# Patient Record
(demographics unavailable — no encounter records)

---

## 2024-10-14 NOTE — PLAN
[FreeTextEntry1] : A/P: DM:  Advised ADA diet, daily regular exercise, to lose weight. Check FPG/PPG at home and keep a log, to c/w meds, repeat A1c and reassess. Advised daily self-foot exams. Annual eval for dilated eye exam.   Constipation: advised to inc oral fiber / fluid intake, meds prn as ordered   Hyperlipidemia:  Advised on strict low-fat diet, daily regular exercise, to c/w meds, FLP/LFT to be monitored.  HTN:  Stable, advised strict low salt diet, daily regular exercise, to c/w meds, bmp to be monitored closely.  Thyroid nodule - US 07/2024 d/w pt - to be monitored   GERD: stable on PPI , to avoid NSAIDS, alcohol  Renal cyst - US 07/2024 d/w pt to be monitored - to repeat US and reassess in 1 yr f/u 3 m

## 2024-10-14 NOTE — HISTORY OF PRESENT ILLNESS
[FreeTextEntry1] : f/u DM  [de-identified] : Alesia is a 46 yo M w PMHx bipolar type 2 disorder,  chronic insomnia,  T2DM well controlled,  low testosterone - is off replacement now, follows with urology.  ED s/p penile implant TIEN uses CPAP nightly BPH s/p prostate surgery non obstructive CAD - asymptomatic currently  Thyroid nodules: Ultrasound 07/2024 discussed with patient Scattered thyroid nodules. Consider follow-up ultrasound in one year. renal cyst: Last ultrasound done in July 2024 discussed with patient following with pain management for neck shots - stable now was seen by Psych - advised labs   Chronic Constipation: Takes daily fiber supplements Colonoscopy - 2023 nl as per pt

## 2025-01-31 NOTE — ASSESSMENT
[FreeTextEntry1] : bipolar stable dm stable htn  hld stable work on weight  medically stable bph see new urologist

## 2025-01-31 NOTE — HISTORY OF PRESENT ILLNESS
[FreeTextEntry1] : for cpe [de-identified] : for cpe history of htn niddm, hld bipolar disorder has been on lithium

## 2025-01-31 NOTE — HEALTH RISK ASSESSMENT
[Good] : ~his/her~  mood as  good [No] : No [No falls in past year] : Patient reported no falls in the past year [Little interest or pleasure doing things] : 1) Little interest or pleasure doing things [Feeling down, depressed, or hopeless] : 2) Feeling down, depressed, or hopeless [0] : 2) Feeling down, depressed, or hopeless: Not at all (0) [PHQ-2 Negative - No further assessment needed] : PHQ-2 Negative - No further assessment needed [Never] : Never [NO] : No [HIV test declined] : HIV test declined [Hepatitis C test declined] : Hepatitis C test declined [None] : None [Alone] : lives alone [College] : College [Single] : single [Feels Safe at Home] : Feels safe at home [Fully functional (bathing, dressing, toileting, transferring, walking, feeding)] : Fully functional (bathing, dressing, toileting, transferring, walking, feeding) [Fully functional (using the telephone, shopping, preparing meals, housekeeping, doing laundry, using] : Fully functional and needs no help or supervision to perform IADLs (using the telephone, shopping, preparing meals, housekeeping, doing laundry, using transportation, managing medications and managing finances) [Smoke Detector] : smoke detector [Safety elements used in home] : safety elements used in home [Seat Belt] :  uses seat belt [[id number]] : 0 [Change in mental status noted] : No change in mental status noted [Language] : denies difficulty with language [Behavior] : denies difficulty with behavior [Learning/Retaining New Information] : denies difficulty learning/retaining new information [Handling Complex Tasks] : denies difficulty handling complex tasks [Reasoning] : denies difficulty with reasoning [Spatial Ability and Orientation] : denies difficulty with spatial ability and orientation [Sexually Active] : not sexually active [High Risk Behavior] : no high risk behavior [Reports changes in hearing] : Reports no changes in hearing [Reports changes in vision] : Reports no changes in vision [Reports normal functional visual acuity (ie: able to read med bottle)] : Reports poor functional visual acuity.  [Reports changes in dental health] : Reports no changes in dental health [Carbon Monoxide Detector] : no carbon monoxide detector [Sunscreen] : does not use sunscreen [Travel to Developing Areas] : does not  travel to developing areas [TB Exposure] : is not being exposed to tuberculosis [Caregiver Concerns] : does not have caregiver concerns [ColonoscopyDate] : 2023

## 2025-03-05 NOTE — HISTORY OF PRESENT ILLNESS
[FreeTextEntry1] : Mr. MICHELLE GALVAN  is a 48 year old male with a PMhx of DM2, HLD, Bipolar Disorder on Lithium who presents with neck and back pain.   Location: Posterior neck and back Onset: Injured at work in 5/2024, was lifting a few cases of tiles, felt immediate pain in back/neck.  Provocation/Palliative: Worse with movement, better with rest Quality: Achy,  Radiation: Down to R upper arm, and down R leg to R knee Severity: Can be 10/10, currently moderate Timing: Not improving with time  Denies any associated numbness. Denies any associated arm or hand weakness. Denies any loss of bowel/bladder control or any groin numbness. Previous medications trialed: Cyclobenzaprine, Ibuprofen Previous procedures relevant to complaint: ESIs in low back with some relief with Dr. Milton Has tried conservative treatment?: Currently in PT  Patient follows with outside pain management physician Dr. Milton

## 2025-03-05 NOTE — ASSESSMENT
[FreeTextEntry1] : Mr. MICHELLE GALVAN is a 48 year old male who presents with chronic neck and low back pain following work incident back in 5/2024. Patient's pain is likely from exacerbation of underlying spondylosis. Denies any red flag signs. Will recommend: - Patient inquired about disability paperwork and work restrictions. Will have patient seen occupational medicine for further evaluation and recommendations - Patient to follow up with Dr. Milton for treatment of his pain, pending a neck injection in next 2 weeks.  Return as needed. Patient aware of red flag signs including any changes to their bowel/bladder control, groin numbness or new weakness. Patient knows to seek immediate attention by calling 911 or going to nearest ER if these symptoms appear.

## 2025-03-05 NOTE — PHYSICAL EXAM
[FreeTextEntry1] : PE: Constitutional: In NAD, calm and cooperative MSK (Neck and Back) 	Inspection: no gross swelling identified 	Palpation: Tenderness of the bilateral lower cervical and lumbar paraspinals 	ROM: Pain at end cervical extension/flexion and with lumbar extension/flexion 	Strength: 5/5 strength in bilateral upper and lower extremities 	Reflexes: 2+ Biceps/Brachioradialis/Triceps/patella/Achilles reflex bilaterally, Hoffmans/Clonus negative bilaterally 	Sensation: Intact to light touch in bilateral upper and lower extremities 	Special tests: Spurlings test negative bilaterally, facet loading positive bilaterally

## 2025-03-05 NOTE — DATA REVIEWED
[FreeTextEntry1] : MR C Spine Stand up MRI 7/2024 reviewed MR L Spine Stand up MRI 7/2024 reviewed CMP 1/2025 reviewed

## 2025-03-17 NOTE — ASSESSMENT
[Indicate if, in your opinion, the incident that the patient described was the competent medical cause of this injury/illness.] : The incident that the patient described was the competent medical cause of this injury/illness: Yes [Indicate if the patient's complaints are consistent with his/her history of the injury/illness.] : Indicate if the patient's complaints are consistent with his/her history of the injury/illness: Yes [Yes] : Yes, it is consistent [Can the patient return to usual work activities as indicated? If yes, indicate date___] : The patient cannot return to usual work activities as indicated. 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 MRI confirms herniated cervical disc and herniated lumbar disc. Patient has decreased ROM of cervical neck. decreased ROM of shoulders, weakness in upper and lower extremities, difficulty walking more than 2 blocks, difficulty sitting for more than 30 minutes, difficulty standing for more than 30 minutes. Pain in neck, lower back and intermittent numbness in lower extremities. [FreeTextEntry2] : Decrease strength in arms, decreased strength in shoulders, decreased strengthen and ROM in bilateral legs.  Patient has decreased ROM of cervical neck. decreased ROM of shoulders, weakness in upper and lower extremities, difficulty walking more than 2 blocks, difficulty sitting for more than 30 minutes, difficulty standing for more than 30 minutes. Pain in neck, lower back and intermittent numbness in lower extremities. [FreeTextEntry3] :  Patient has decreased ROM of cervical neck. decreased ROM of shoulders, weakness in upper and lower extremities, difficulty walking more than 2 blocks, difficulty sitting for more than 30 minutes, difficulty standing for more than 30 minutes. Pain in neck, lower back and intermittent numbness in lower extremities. [FreeTextEntry4] :  Patient has decreased ROM of cervical neck. decreased ROM of shoulders, weakness in upper and lower extremities, difficulty walking more than 2 blocks, difficulty sitting for more than 30 minutes, difficulty standing for more than 30 minutes. Pain in neck, lower back and intermittent numbness in lower extremities. [FreeTextEntry5] : 90 [FreeTextEntry6] : Difficulty standing for more than 30 minutes, decreased strength in upper extremities and decreased strength in lower extremities. Decreased ROM in cervical neck. decreased ROM lumbar area.

## 2025-03-17 NOTE — HISTORY OF PRESENT ILLNESS
[FreeTextEntry1] : Patient is a 48-year-old male who presents with a neck and lower back injury that occurred at work on 05/10/2024. Patient was lifting cases of ceiling tiles and noted neck and lower back pain. 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  [FreeTextEntry4] : Physical therapy with some improvement in mobility, chiropractic treatments weekly, multiple epidural injections in the lower back with some relief of pain [FreeTextEntry5] : No [Has the patient missed work because of the injury/illness?] : The patient has missed work because of the injury/illness. [FreeTextEntry6] : 06/28/2024

## 2025-03-17 NOTE — REVIEW OF SYSTEMS
[Fever] : no fever [Chills] : no chills [Feeling Poorly] : feeling poorly [Feeling Tired] : feeling tired [Arthralgias] : arthralgias [Joint Pain] : no joint pain [Joint Swelling] : no joint swelling [Joint Stiffness] : no joint stiffness [Limb Pain] : no limb pain [Limb Swelling] : no limb swelling [Confused] : no confusion [Convulsions] : no convulsions [Dizziness] : no dizziness [Fainting] : no fainting [Limb Weakness] : limb weakness [Difficulty Walking] : difficulty walking [Negative] : Heme/Lymph [FreeTextEntry2] : Difficulty walking more than 2 blocks, difficulty sitting for more than 30 minutes and difficulty standing for more than 30 minutes. [FreeTextEntry9] : decreased ROM in arms, decreased strength in upper extremities and lower extremities. [de-identified] : intermittent pins and needles in legs

## 2025-03-17 NOTE — PLAN
[FreeTextEntry1] : Continue physical therapy 2 times week for 2 weeks, NSAIDS prn with food, follow up with pain management for epidural injections.  [FreeTextEntry3] : fair [FreeTextEntry4] : 4 weeks

## 2025-03-17 NOTE — PHYSICAL EXAM
[General Appearance - Alert] : alert [General Appearance - In No Acute Distress] : in no acute distress [Sclera] : the sclera and conjunctiva were normal [PERRL With Normal Accommodation] : pupils were equal in size, round, and reactive to light [Extraocular Movements] : extraocular movements were intact [Outer Ear] : the ears and nose were normal in appearance [Oropharynx] : the oropharynx was normal [Auscultation Breath Sounds / Voice Sounds] : lungs were clear to auscultation bilaterally [Heart Rate And Rhythm] : heart rate was normal and rhythm regular [Heart Sounds] : normal S1 and S2 [Heart Sounds Gallop] : no gallops [Murmurs] : no murmurs [Heart Sounds Pericardial Friction Rub] : no pericardial rub [Full Pulse] : the pedal pulses are present [Edema] : there was no peripheral edema [Breast Appearance] : normal in appearance [Breast Palpation Mass] : no palpable masses [Bowel Sounds] : normal bowel sounds [Abdomen Soft] : soft [Abdomen Tenderness] : non-tender [Abdomen Mass (___ Cm)] : no abdominal mass palpated [Skin Color & Pigmentation] : normal skin color and pigmentation [Skin Turgor] : normal skin turgor [] : no rash [FreeTextEntry1] : decreased sensation to light touch in lower extremities able to feel 6.10 level filament. Unable to feel 2.83 level filament bilaterally  [Oriented To Time, Place, And Person] : oriented to person, place, and time [Impaired Insight] : insight and judgment were intact [Affect] : the affect was normal

## 2025-04-11 NOTE — REVIEW OF SYSTEMS
[Feeling Poorly] : feeling poorly [Feeling Tired] : feeling tired [Arthralgias] : arthralgias [Limb Weakness] : limb weakness [Difficulty Walking] : difficulty walking [Negative] : Heme/Lymph [Fever] : no fever [Chills] : no chills [Joint Pain] : no joint pain [Joint Swelling] : no joint swelling [Joint Stiffness] : no joint stiffness [Limb Pain] : no limb pain [Limb Swelling] : no limb swelling [Confused] : no confusion [Convulsions] : no convulsions [Dizziness] : no dizziness [Fainting] : no fainting [FreeTextEntry2] : Difficulty walking more than 2 blocks, difficulty sitting for more than 30 minutes and difficulty standing for more than 30 minutes. [FreeTextEntry9] : decreased ROM in arms, decreased strength in upper extremities and lower extremities. [de-identified] : intermittent pins and needles in legs

## 2025-04-11 NOTE — PLAN
[FreeTextEntry1] : Continue physical therapy 2 times week for 6 weeks, NSAIDS prn with food, follow up with pain management for epidural injections. Epidural for pain management, follow up with neurologist for nerve conduction testing  [FreeTextEntry3] : fair [FreeTextEntry4] : 4 weeks

## 2025-04-11 NOTE — PHYSICAL EXAM
[General Appearance - Alert] : alert [General Appearance - In No Acute Distress] : in no acute distress [Sclera] : the sclera and conjunctiva were normal [PERRL With Normal Accommodation] : pupils were equal in size, round, and reactive to light [Extraocular Movements] : extraocular movements were intact [Outer Ear] : the ears and nose were normal in appearance [Oropharynx] : the oropharynx was normal [Auscultation Breath Sounds / Voice Sounds] : lungs were clear to auscultation bilaterally [Heart Rate And Rhythm] : heart rate was normal and rhythm regular [Heart Sounds] : normal S1 and S2 [Heart Sounds Gallop] : no gallops [Murmurs] : no murmurs [Heart Sounds Pericardial Friction Rub] : no pericardial rub [Bowel Sounds] : normal bowel sounds [Abdomen Soft] : soft [Abdomen Tenderness] : non-tender [Abdomen Mass (___ Cm)] : no abdominal mass palpated [Skin Color & Pigmentation] : normal skin color and pigmentation [Skin Turgor] : normal skin turgor [] : no rash [Oriented To Time, Place, And Person] : oriented to person, place, and time [Impaired Insight] : insight and judgment were intact [Affect] : the affect was normal [FreeTextEntry1] : decreased sensation to light touch in lower extremities able to feel 6.10 level filament. Unable to feel 2.83 level filament bilaterally

## 2025-04-11 NOTE — HISTORY OF PRESENT ILLNESS
[Has the patient missed work because of the injury/illness?] : The patient has missed work because of the injury/illness. [FreeTextEntry1] : Patient is a 48-year-old male who presents with a neck and lower back injury that occurred at work on 05/10/2024. Patient was lifting cases of ceiling tiles and noted neck and lower back pain. 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  [FreeTextEntry4] : Physical therapy with some improvement in mobility, chiropractic treatments weekly, multiple epidural injections in the lower back with some relief of pain [FreeTextEntry5] : No [FreeTextEntry6] : 06/28/2024

## 2025-05-05 NOTE — PHYSICAL EXAM
[FreeTextEntry1] : No lumbar palpation tenderness Full range of movement of lumbar spine  Minimal postural tremor of the arms No resting tremor No intention tremor No cogwheeling or bradykinesia [General Appearance - Alert] : alert [General Appearance - In No Acute Distress] : in no acute distress [General Appearance - Well-Appearing] : healthy appearing [Oriented To Time, Place, And Person] : oriented to person, place, and time [Impaired Insight] : insight and judgment were intact [Affect] : the affect was normal [Memory Recent] : recent memory was not impaired [Person] : oriented to person [Place] : oriented to place [Time] : oriented to time [Concentration Intact] : normal concentrating ability [Visual Intact] : visual attention was ~T not ~L decreased [Writing A Sentence] : difficulty writing a sentence [Fluency] : fluency intact [Comprehension] : comprehension intact [Past History] : adequate knowledge of personal past history [Cranial Nerves Optic (II)] : visual acuity intact bilaterally,  visual fields full to confrontation, pupils equal round and reactive to light [Cranial Nerves Oculomotor (III)] : extraocular motion intact [Cranial Nerves Trigeminal (V)] : facial sensation intact symmetrically [Cranial Nerves Facial (VII)] : face symmetrical [Cranial Nerves Vestibulocochlear (VIII)] : hearing was intact bilaterally [Cranial Nerves Glossopharyngeal (IX)] : tongue and palate midline [Cranial Nerves Accessory (XI - Cranial And Spinal)] : head turning and shoulder shrug symmetric [Cranial Nerves Hypoglossal (XII)] : there was no tongue deviation with protrusion [Motor Tone] : muscle tone was normal in all four extremities [Motor Strength] : muscle strength was normal in all four extremities [No Muscle Atrophy] : normal bulk in all four extremities [Paresis Pronator Drift Right-Sided] : no pronator drift on the right [Paresis Pronator Drift Left-Sided] : no pronator drift on the left [Sensation Tactile Decrease] : light touch was intact [Sensation Pain / Temperature Decrease] : pain and temperature was intact [Proprioception] : proprioception was intact [Romberg's Sign] : Romberg's sign was negtive [Abnormal Walk] : normal gait [Balance] : balance was intact [Past-pointing] : there was no past-pointing [Tremor] : no tremor present [Coordination - Dysmetria Impaired Finger-to-Nose Bilateral] : not present [2+] : Patella left 2+ [1+] : Ankle jerk left 1+ [PERRL With Normal Accommodation] : pupils were equal in size, round, reactive to light, with normal accommodation [Extraocular Movements] : extraocular movements were intact [Full Visual Field] : full visual field

## 2025-05-05 NOTE — ASSESSMENT
[FreeTextEntry1] : Reports some balance difficulties going on for about a year Gait and balance appeared normal in the office today Other than a very mild postural tremor his neurological examination is entirely normal  Reported balance difficulties may be multifactorial related to chronic back pain and herniated disks along with the possibility of diabetic neuropathy He does have some numbness in his feet  I am suggesting a brain MRI to look for any possible structural lesions that could affect the balance I am also recommending EMG and nerve conduction studies to evaluate for possible peripheral neuropathy  His reported tremors sound like a mild benign essential tremor No clinical signs of parkinsonism.

## 2025-05-05 NOTE — HISTORY OF PRESENT ILLNESS
[FreeTextEntry1] : This 48-year-old man was seen in neurological consultation today He reports some balance difficulties going on for about a year He says he actually fell twice climbing stairs.  No other falls reported.  No injury from that Also reports some shaking of his hands going on for about 5 years.  Says it is mild.  Notices it when he is doing something with his hands.  He is right-handed.  Says both hands equally involved.  Has no shaking at rest Has no trouble carrying out all activities He does have some numbness in his feet Has chronic back pain for which he sees orthopedics and pain management.  Medical history significant for diabetes, hypertension, hyperlipidemia, bipolar on lithium   Former smoker, no alcohol use  He is a young but currently out of work because of back injury.

## 2025-05-09 NOTE — PHYSICAL EXAM
[General Appearance - Alert] : alert [General Appearance - In No Acute Distress] : in no acute distress [Sclera] : the sclera and conjunctiva were normal [PERRL With Normal Accommodation] : pupils were equal in size, round, and reactive to light [Extraocular Movements] : extraocular movements were intact [Outer Ear] : the ears and nose were normal in appearance [Oropharynx] : the oropharynx was normal [Auscultation Breath Sounds / Voice Sounds] : lungs were clear to auscultation bilaterally [Heart Rate And Rhythm] : heart rate was normal and rhythm regular [Heart Sounds] : normal S1 and S2 [Heart Sounds Gallop] : no gallops [Murmurs] : no murmurs [Heart Sounds Pericardial Friction Rub] : no pericardial rub [Full Pulse] : the pedal pulses are present [Edema] : there was no peripheral edema [Bowel Sounds] : normal bowel sounds [Abdomen Soft] : soft [Abdomen Tenderness] : non-tender [Abdomen Mass (___ Cm)] : no abdominal mass palpated [Skin Color & Pigmentation] : normal skin color and pigmentation [Skin Turgor] : normal skin turgor [] : no rash [FreeTextEntry1] : decreased sensation to light touch in lower extremities able to feel 6.10 level filament. Unable to feel 2.83 level filament bilaterally  [Oriented To Time, Place, And Person] : oriented to person, place, and time [Impaired Insight] : insight and judgment were intact [Affect] : the affect was normal

## 2025-05-09 NOTE — PLAN
[FreeTextEntry1] : Continue physical therapy 2 times week for 6 weeks, NSAIDS prn with food, follow up with pain management for epidural injections. Epidural for pain management, follow up with neurologist for nerve conduction testing and MRI of the brain to rule out lesions. [FreeTextEntry3] : fair [FreeTextEntry4] : 6 weeks

## 2025-05-09 NOTE — HISTORY OF PRESENT ILLNESS
[FreeTextEntry1] : Patient is a 48-year-old male who presents with a neck and lower back injury that occurred at work on 05/10/2024. Patient was lifting cases of ceiling tiles and noted neck and lower back pain. 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 Epidural provided some relief of neck pain and lower back pain. Pt scheduled for EEG and MRI brain pending preauthorization. 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  [FreeTextEntry4] : Physical therapy with some improvement in mobility, chiropractic treatments weekly, multiple epidural injections in the lower back with some relief of pain [FreeTextEntry5] : No [Has the patient missed work because of the injury/illness?] : The patient has missed work because of the injury/illness. [FreeTextEntry6] : 06/28/2024

## 2025-05-09 NOTE — REVIEW OF SYSTEMS
[Fever] : no fever [Chills] : no chills [Feeling Poorly] : feeling poorly [Feeling Tired] : feeling tired [Arthralgias] : arthralgias [Joint Pain] : no joint pain [Joint Swelling] : no joint swelling [Joint Stiffness] : no joint stiffness [Limb Pain] : no limb pain [Limb Swelling] : no limb swelling [Confused] : no confusion [Convulsions] : no convulsions [Dizziness] : no dizziness [Fainting] : no fainting [Limb Weakness] : limb weakness [Difficulty Walking] : difficulty walking [Negative] : Heme/Lymph [FreeTextEntry2] : Difficulty walking more than 3 blocks, difficulty sitting for more than 30 minutes and difficulty standing for more than 30 minutes. [FreeTextEntry9] : decreased ROM in arms, decreased strength in upper extremities and lower extremities. [de-identified] : intermittent pins and needles in legs

## 2025-06-20 NOTE — PLAN
[FreeTextEntry1] : Continue physical therapy 2 times week for 6 weeks, NSAIDS prn with food, follow up with pain management for epidural injections. Epidural for pain management, follow up with neurologist for nerve conduction testing and MRI of the brain to rule out lesions. [FreeTextEntry3] : fair [FreeTextEntry4] : 3 weeks

## 2025-06-20 NOTE — PHYSICAL EXAM
[General Appearance - Alert] : alert [General Appearance - In No Acute Distress] : in no acute distress [Sclera] : the sclera and conjunctiva were normal [Outer Ear] : the ears and nose were normal in appearance [FreeTextEntry1] : slow wide gait, pedal edema +1 [Skin Color & Pigmentation] : normal skin color and pigmentation [] : no rash [Oriented To Time, Place, And Person] : oriented to person, place, and time [Impaired Insight] : insight and judgment were intact [Affect] : the affect was normal

## 2025-06-20 NOTE — REVIEW OF SYSTEMS
[Fever] : no fever [Chills] : no chills [Feeling Poorly] : feeling poorly [Feeling Tired] : feeling tired [Recent Weight Gain (___ Lbs)] : no recent weight gain [Recent Weight Loss (___ Lbs)] : no recent weight loss [Dysuria] : no dysuria [Incontinence] : no incontinence [Hesitancy] : no urinary hesitancy [Nocturia] : nocturia [Genital Lesion] : no genital lesions [Testicular Pain] : no testicular pain [Arthralgias] : arthralgias [Joint Pain] : no joint pain [Joint Swelling] : no joint swelling [Joint Stiffness] : no joint stiffness [Limb Pain] : limb pain [Limb Swelling] : no limb swelling [Confused] : no confusion [Convulsions] : no convulsions [Dizziness] : no dizziness [Fainting] : no fainting [Limb Weakness] : limb weakness [Difficulty Walking] : difficulty walking [Suicidal] : not suicidal [Sleep Disturbances] : sleep disturbances [Anxiety] : no anxiety [Depression] : no depression [Change In Personality] : no personality change [Emotional Problems] : no emotional problems [Negative] : Heme/Lymph [FreeTextEntry2] : Difficulty sleeping due to neck pain, difficulty walking more than 3 blocks, difficulty sitting for more than 30 minutes. [FreeTextEntry8] : frequency increasing follow by urologist [FreeTextEntry9] : neck pain with ROM,

## 2025-06-20 NOTE — ASSESSMENT
[Indicate if, in your opinion, the incident that the patient described was the competent medical cause of this injury/illness.] : The incident that the patient described was the competent medical cause of this injury/illness: Yes [Indicate if the patient's complaints are consistent with his/her history of the injury/illness.] : Indicate if the patient's complaints are consistent with his/her history of the injury/illness: Yes [Yes] : Yes, it is consistent [Can the patient return to usual work activities as indicated? If yes, indicate date___] : The patient cannot return to usual work activities as indicated. 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 MRI confirms herniated cervical disc and herniated lumbar disc. Patient has some improvement with physical therapy and epidural injections. Patient has improved ROM of cervical neck. decreased ROM of shoulders, improved strength in upper and lower extremities, difficulty walking more than 3 blocks, difficulty sitting for more than 30 minutes, difficulty standing for more than 30 minutes. Pain in neck, lower back and intermittent numbness in lower extremities. [FreeTextEntry2] : Decrease strength in arms, decreased strength in shoulders, decreased strengthen and ROM in bilateral legs.  Patient has decreased ROM of cervical neck. decreased ROM of shoulders, weakness in upper and lower extremities, difficulty walking more than 2 blocks, difficulty sitting for more than 30 minutes, difficulty standing for more than 30 minutes. Pain in neck, lower back and intermittent numbness in lower extremities. [FreeTextEntry3] :  Patient has decreased ROM of cervical neck. decreased ROM of shoulders, weakness in upper and lower extremities, difficulty walking more than 2 blocks, difficulty sitting for more than 30 minutes, difficulty standing for more than 30 minutes. Pain in neck, lower back and intermittent numbness in lower extremities. [FreeTextEntry4] :  Patient has decreased ROM of cervical neck. decreased ROM of shoulders, weakness in upper and lower extremities, difficulty walking more than 2 blocks, difficulty sitting for more than 30 minutes, difficulty standing for more than 30 minutes. Pain in neck, lower back and intermittent numbness in lower extremities. [FreeTextEntry5] : 80 [FreeTextEntry6] : Difficulty standing for more than 30 minutes, decreased strength in upper extremities and decreased strength in lower extremities. Decreased ROM in cervical neck. decreased ROM lumbar area.

## 2025-07-11 NOTE — HISTORY OF PRESENT ILLNESS
[FreeTextEntry1] : Patient is a 48-year-old male who presents with a neck and lower back injury that occurred at work on 05/10/2024. Patient was lifting cases of ceiling tiles and noted neck and lower back pain. 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 Epidural provided some relief of neck pain and lower back pain. Pt had for EEG and MRI brain. See report. 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  [FreeTextEntry4] : Physical therapy with some improvement in mobility, chiropractic treatments weekly, multiple epidural injections in the lower back with some relief of pain [FreeTextEntry5] : No [Has the patient missed work because of the injury/illness?] : The patient has missed work because of the injury/illness. [FreeTextEntry6] : 06/28/2024

## 2025-07-11 NOTE — PLAN
[FreeTextEntry1] : Continue physical therapy 2 times week for 6 weeks, NSAIDS prn with food, follow up with pain management for epidural injections. Epidural for pain management, follow up neurologist completed for nerve conduction testing and MRI of the brain to rule out lesions. Noted occipital cyst. Will follow up with ENT. [FreeTextEntry3] : fair [FreeTextEntry4] : 4 weeks

## 2025-07-11 NOTE — REVIEW OF SYSTEMS
no [Fever] : no fever [Chills] : no chills [Feeling Poorly] : feeling poorly [Feeling Tired] : feeling tired [Recent Weight Gain (___ Lbs)] : no recent weight gain [Recent Weight Loss (___ Lbs)] : no recent weight loss [Dysuria] : no dysuria [Incontinence] : no incontinence [Hesitancy] : no urinary hesitancy [Nocturia] : nocturia [Genital Lesion] : no genital lesions [Testicular Pain] : no testicular pain [Arthralgias] : arthralgias [Joint Pain] : no joint pain [Joint Swelling] : no joint swelling [Joint Stiffness] : no joint stiffness [Limb Pain] : limb pain [Limb Swelling] : no limb swelling [Confused] : no confusion [Convulsions] : no convulsions [Dizziness] : no dizziness [Fainting] : no fainting [Limb Weakness] : limb weakness [Difficulty Walking] : difficulty walking [Suicidal] : not suicidal [Sleep Disturbances] : sleep disturbances [Anxiety] : no anxiety [Depression] : no depression [Change In Personality] : no personality change [Emotional Problems] : no emotional problems [Negative] : Heme/Lymph [FreeTextEntry2] : Difficulty sleeping due to neck pain, difficulty walking more than 3 blocks, difficulty sitting for more than 30 minutes. [FreeTextEntry8] : frequency increasing follow by urologist [FreeTextEntry9] : neck pain with ROM, right shoulder pain with  rom, intermittent paresthesia bilateral hands and feet [de-identified] : gait slow and steady, difficulty with walking up and down stairs.

## 2025-07-11 NOTE — PHYSICAL EXAM
[General Appearance - Alert] : alert [General Appearance - In No Acute Distress] : in no acute distress [Sclera] : the sclera and conjunctiva were normal [Outer Ear] : the ears and nose were normal in appearance [Respiration, Rhythm And Depth] : normal respiratory rhythm and effort [Auscultation Breath Sounds / Voice Sounds] : lungs were clear to auscultation bilaterally [Heart Rate And Rhythm] : heart rate was normal and rhythm regular [Heart Sounds] : normal S1 and S2 [Heart Sounds Gallop] : no gallops [Murmurs] : no murmurs [Heart Sounds Pericardial Friction Rub] : no pericardial rub [Full Pulse] : the pedal pulses are present [Bowel Sounds] : normal bowel sounds [Abdomen Soft] : soft [Abdomen Tenderness] : non-tender [Abdomen Mass (___ Cm)] : no abdominal mass palpated [Skin Color & Pigmentation] : normal skin color and pigmentation [] : no rash [Cranial Nerves] : cranial nerves 2-12 were intact [Deep Tendon Reflexes (DTR)] : deep tendon reflexes were 2+ and symmetric [FreeTextEntry1] : decreased sensation in lower extremities, slight weakness in SLR [Oriented To Time, Place, And Person] : oriented to person, place, and time [Impaired Insight] : insight and judgment were intact [Affect] : the affect was normal

## 2025-07-23 NOTE — HISTORY OF PRESENT ILLNESS
[FreeTextEntry1] : for follow up [de-identified] : for follow up  history of htn hld niddm patient is concerned about his weight